# Patient Record
Sex: FEMALE | Race: WHITE | URBAN - METROPOLITAN AREA
[De-identification: names, ages, dates, MRNs, and addresses within clinical notes are randomized per-mention and may not be internally consistent; named-entity substitution may affect disease eponyms.]

---

## 2017-03-01 ENCOUNTER — IMPORTED ENCOUNTER (OUTPATIENT)
Dept: URBAN - METROPOLITAN AREA CLINIC 50 | Facility: CLINIC | Age: 72
End: 2017-03-01

## 2017-06-20 ENCOUNTER — IMPORTED ENCOUNTER (OUTPATIENT)
Dept: URBAN - METROPOLITAN AREA CLINIC 50 | Facility: CLINIC | Age: 72
End: 2017-06-20

## 2017-06-20 NOTE — PATIENT DISCUSSION
"""Follow drusen without treatment. Call if vision or distortion increases. Recommend regular amsler checks. """

## 2017-06-23 ENCOUNTER — IMPORTED ENCOUNTER (OUTPATIENT)
Dept: URBAN - METROPOLITAN AREA CLINIC 50 | Facility: CLINIC | Age: 72
End: 2017-06-23

## 2017-10-19 ENCOUNTER — IMPORTED ENCOUNTER (OUTPATIENT)
Dept: URBAN - METROPOLITAN AREA CLINIC 50 | Facility: CLINIC | Age: 72
End: 2017-10-19

## 2017-10-20 ENCOUNTER — IMPORTED ENCOUNTER (OUTPATIENT)
Dept: URBAN - METROPOLITAN AREA CLINIC 50 | Facility: CLINIC | Age: 72
End: 2017-10-20

## 2017-12-18 ENCOUNTER — IMPORTED ENCOUNTER (OUTPATIENT)
Dept: URBAN - METROPOLITAN AREA CLINIC 50 | Facility: CLINIC | Age: 72
End: 2017-12-18

## 2017-12-19 ENCOUNTER — IMPORTED ENCOUNTER (OUTPATIENT)
Dept: URBAN - METROPOLITAN AREA CLINIC 50 | Facility: CLINIC | Age: 72
End: 2017-12-19

## 2017-12-21 ENCOUNTER — IMPORTED ENCOUNTER (OUTPATIENT)
Dept: URBAN - METROPOLITAN AREA CLINIC 50 | Facility: CLINIC | Age: 72
End: 2017-12-21

## 2017-12-27 ENCOUNTER — IMPORTED ENCOUNTER (OUTPATIENT)
Dept: URBAN - METROPOLITAN AREA CLINIC 50 | Facility: CLINIC | Age: 72
End: 2017-12-27

## 2018-01-10 ENCOUNTER — IMPORTED ENCOUNTER (OUTPATIENT)
Dept: URBAN - METROPOLITAN AREA CLINIC 50 | Facility: CLINIC | Age: 73
End: 2018-01-10

## 2018-01-10 NOTE — PATIENT DISCUSSION
"""Suggesting we perform the SLT laser to get the IOP down.  OD then OS."" ""Continue Zioptan both eyes at bedtime. """

## 2018-01-10 NOTE — PATIENT DISCUSSION
"""Continue Restasis both eyes twice a day. "" ""Continue Refresh both eyes two - four times a day.  """

## 2018-01-23 ENCOUNTER — IMPORTED ENCOUNTER (OUTPATIENT)
Dept: URBAN - METROPOLITAN AREA CLINIC 50 | Facility: CLINIC | Age: 73
End: 2018-01-23

## 2018-02-13 ENCOUNTER — IMPORTED ENCOUNTER (OUTPATIENT)
Dept: URBAN - METROPOLITAN AREA CLINIC 50 | Facility: CLINIC | Age: 73
End: 2018-02-13

## 2018-02-13 NOTE — PATIENT DISCUSSION
"""SLT performed today with signed consent. IOP stable."" ""Continue Zioptan both eyes at bedtime. "" ""Start Prednisolone Acetate left eye twice a day.  for 1 week

## 2018-05-08 ENCOUNTER — IMPORTED ENCOUNTER (OUTPATIENT)
Dept: URBAN - METROPOLITAN AREA CLINIC 50 | Facility: CLINIC | Age: 73
End: 2018-05-08

## 2018-07-10 ENCOUNTER — IMPORTED ENCOUNTER (OUTPATIENT)
Dept: URBAN - METROPOLITAN AREA CLINIC 50 | Facility: CLINIC | Age: 73
End: 2018-07-10

## 2018-07-10 NOTE — PATIENT DISCUSSION
"""Continue Restasis both eyes twice a day. "" ""Continue Refresh Optive Jovan-3 both eyes two - four times a day.  or Refresh Gel"""

## 2018-12-17 ENCOUNTER — IMPORTED ENCOUNTER (OUTPATIENT)
Dept: URBAN - METROPOLITAN AREA CLINIC 50 | Facility: CLINIC | Age: 73
End: 2018-12-17

## 2019-01-10 ENCOUNTER — IMPORTED ENCOUNTER (OUTPATIENT)
Dept: URBAN - METROPOLITAN AREA CLINIC 50 | Facility: CLINIC | Age: 74
End: 2019-01-10

## 2019-01-18 ENCOUNTER — IMPORTED ENCOUNTER (OUTPATIENT)
Dept: URBAN - METROPOLITAN AREA CLINIC 50 | Facility: CLINIC | Age: 74
End: 2019-01-18

## 2019-02-23 ENCOUNTER — IMPORTED ENCOUNTER (OUTPATIENT)
Dept: URBAN - METROPOLITAN AREA CLINIC 50 | Facility: CLINIC | Age: 74
End: 2019-02-23

## 2019-04-16 ENCOUNTER — IMPORTED ENCOUNTER (OUTPATIENT)
Dept: URBAN - METROPOLITAN AREA CLINIC 50 | Facility: CLINIC | Age: 74
End: 2019-04-16

## 2019-06-19 ENCOUNTER — IMPORTED ENCOUNTER (OUTPATIENT)
Dept: URBAN - METROPOLITAN AREA CLINIC 50 | Facility: CLINIC | Age: 74
End: 2019-06-19

## 2019-06-19 NOTE — PATIENT DISCUSSION
"""Continue Restasis both eyes twice a day ."" ""Continue Refresh Optive Jovan-3 both eyes two - four times a day

## 2019-08-29 NOTE — PATIENT DISCUSSION
BOTOX for COSMETIC (30 units): The patient presented with multiple facial rhytids after informed consent the patient was treated with Botox at a dosage documented on [employer] in this cart. The patient tolerated the procedure well.

## 2019-09-20 NOTE — PATIENT DISCUSSION
BOTOX for COSMETIC (5 units): The patient presented with multiple facial rhytids after informed consent the patient was treated with Botox at a dosage documented on [employer] in this cart. The patient tolerated the procedure well.

## 2019-10-15 NOTE — PATIENT DISCUSSION
2 vials of Voluma were given to the cheeks after appropriate consent with a careful review of the risks and benefits. The area was prepped with an isopropyl alcohol pad. The patient was given ice packs to minimize discomfort before, during, and after the procedure. The patient tolerated the procedure well and was given post procedural care instructions.

## 2019-10-15 NOTE — PATIENT DISCUSSION
BOTOX for COSMETIC (10 units): The patient presented with multiple facial rhytids after informed consent the patient was treated with Botox at a dosage documented on [employer] in this cart. The patient tolerated the procedure well.

## 2019-11-21 ENCOUNTER — IMPORTED ENCOUNTER (OUTPATIENT)
Dept: URBAN - METROPOLITAN AREA CLINIC 50 | Facility: CLINIC | Age: 74
End: 2019-11-21

## 2019-12-13 NOTE — PATIENT DISCUSSION
One vial of Voluma were given to the cheeks after appropriate consent with a careful review of the risks and benefits. The area was prepped with an isopropyl alcohol pad. The patient was given ice packs to minimize discomfort before, during, and after the procedure. The patient tolerated the procedure well and was given post procedural care instructions.

## 2020-01-31 ENCOUNTER — IMPORTED ENCOUNTER (OUTPATIENT)
Dept: URBAN - METROPOLITAN AREA CLINIC 50 | Facility: CLINIC | Age: 75
End: 2020-01-31

## 2020-02-07 NOTE — PATIENT DISCUSSION
1 vial of Voluma was given to the cheeks after appropriate consent with a careful review of the risks and benefits. The area was prepped with an isopropyl alcohol pad and a chlorhexadine swab. The patient was given ice packs to minimize discomfort before, during, and after the procedure. The patient tolerated the procedure well and was given post procedural care instructions.

## 2020-03-17 NOTE — PATIENT DISCUSSION
One vial of Voluma was given to the cheeks after appropriate consent with a careful review of the risks and benefits. The area was prepped with chlorhexidine swabstick. The patient was given ice packs to minimize discomfort before, during, and after the procedure. The patient tolerated the procedure well and was given post procedural care instructions.

## 2020-07-23 ENCOUNTER — IMPORTED ENCOUNTER (OUTPATIENT)
Dept: URBAN - METROPOLITAN AREA CLINIC 50 | Facility: CLINIC | Age: 75
End: 2020-07-23

## 2020-07-30 ENCOUNTER — IMPORTED ENCOUNTER (OUTPATIENT)
Dept: URBAN - METROPOLITAN AREA CLINIC 50 | Facility: CLINIC | Age: 75
End: 2020-07-30

## 2020-07-31 ENCOUNTER — IMPORTED ENCOUNTER (OUTPATIENT)
Dept: URBAN - METROPOLITAN AREA CLINIC 50 | Facility: CLINIC | Age: 75
End: 2020-07-31

## 2020-08-07 ENCOUNTER — IMPORTED ENCOUNTER (OUTPATIENT)
Dept: URBAN - METROPOLITAN AREA CLINIC 50 | Facility: CLINIC | Age: 75
End: 2020-08-07

## 2020-08-07 NOTE — PATIENT DISCUSSION
"""s/p SLT OD (1/23/18) OS (2/13/18)- IOP stable at this time without drop therapy.  Will monitor."""

## 2020-08-07 NOTE — PATIENT DISCUSSION
"""Continue Restasis both eyes twice a day ."" ""Continue Artificial tears both eyes twice a day ."" ""Continue Warm compresses both eyes as needed

## 2021-02-10 ENCOUNTER — IMPORTED ENCOUNTER (OUTPATIENT)
Dept: URBAN - METROPOLITAN AREA CLINIC 50 | Facility: CLINIC | Age: 76
End: 2021-02-10

## 2021-02-10 NOTE — PATIENT DISCUSSION
"""Continue Restasis both eyes twice a day ."" ""Continue Artificial tears both eyes as needed . "" ""Continue Warm compresses both eyes as needed

## 2021-04-18 ASSESSMENT — VISUAL ACUITY
OS_CC: J1+'PUSH'
OS_CC: J1@ 16 IN
OS_CC: 20/25-1
OD_OTHER: 20/40. 20/50.
OS_CC: 20/20-2
OS_CC: 20/25-1
OD_CC: 20/25
OD_BAT: 20/40
OS_CC: 20/25
OD_OTHER: 20/50. 20/60.
OS_CC: 20/20
OS_CC: 20/20
OD_CC: 20/25
OD_CC: 20/25
OS_CC: 20/20
OS_CC: 20/30
OD_CC: 20/20
OS_CC: 20/25
OD_CC: 20/20
OD_BAT: 20/30
OS_OTHER: 20/40. 20/60.
OS_OTHER: 20/25. 20/30.
OS_CC: 20/20
OS_BAT: 20/40
OD_BAT: 20/50
OD_CC: 20/25
OS_CC: 20/30+1
OD_CC: J1+ SLOWLY
OD_CC: J1@ 16 IN
OS_CC: 20/20
OD_BAT: 20/25
OD_OTHER: 20/25. 20/30.
OD_CC: 20/25
OD_CC: 20/25
OS_CC: 20/25-1
OS_CC: 20/30-1
OS_CC: 20/30+
OD_CC: 20/25-1
OD_CC: 20/20
OD_CC: J1+@ 15 IN
OS_BAT: 20/25
OD_CC: 20/20
OD_CC: J1+'PUSH'
OS_OTHER: 20/40. 20/40-.
OS_OTHER: 20/30. 20/40.
OD_CC: 20/25
OD_PH: 20/25-1
OD_CC: 20/30-1
OS_CC: 20/25-1
OD_OTHER: 20/30. 20/40.
OD_CC: 20/25-1
OS_PH: 20/30
OS_CC: 20/20
OD_CC: 20/30-1
OS_BAT: 20/30
OD_CC: 20/25
OS_CC: J1+@ 15 IN
OS_CC: J1+ SLOWLY
OS_BAT: 20/40
OD_CC: 20/25-1

## 2021-04-18 ASSESSMENT — TONOMETRY
OD_IOP_MMHG: 14
OD_IOP_MMHG: 15
OS_IOP_MMHG: 15
OD_IOP_MMHG: 14
OS_IOP_MMHG: 19
OD_IOP_MMHG: 16
OD_IOP_MMHG: 14
OD_IOP_MMHG: 17
OD_IOP_MMHG: 14
OS_IOP_MMHG: 12
OD_IOP_MMHG: 15
OD_IOP_MMHG: 14
OS_IOP_MMHG: 14
OD_IOP_MMHG: 16
OS_IOP_MMHG: 11
OS_IOP_MMHG: 14
OD_IOP_MMHG: 20
OD_IOP_MMHG: 14
OS_IOP_MMHG: 15
OS_IOP_MMHG: 17
OD_IOP_MMHG: 15
OS_IOP_MMHG: 13
OS_IOP_MMHG: 10
OS_IOP_MMHG: 17
OS_IOP_MMHG: 18
OS_IOP_MMHG: 16
OD_IOP_MMHG: 13
OD_IOP_MMHG: 22
OS_IOP_MMHG: 15
OD_IOP_MMHG: 27
OS_IOP_MMHG: 13
OD_IOP_MMHG: 13
OS_IOP_MMHG: 15
OS_IOP_MMHG: 12
OS_IOP_MMHG: 11
OD_IOP_MMHG: 15
OS_IOP_MMHG: 15
OS_IOP_MMHG: 12
OD_IOP_MMHG: 17
OD_IOP_MMHG: 16
OS_IOP_MMHG: 12
OS_IOP_MMHG: 14
OS_IOP_MMHG: 13
OD_IOP_MMHG: 13
OS_IOP_MMHG: 13
OD_IOP_MMHG: 21
OD_IOP_MMHG: 13
OD_IOP_MMHG: 18
OS_IOP_MMHG: 17
OD_IOP_MMHG: 13
OS_IOP_MMHG: 12
OS_IOP_MMHG: 11
OD_IOP_MMHG: 15
OD_IOP_MMHG: 28
OS_IOP_MMHG: 17
OD_IOP_MMHG: 12

## 2021-04-18 ASSESSMENT — PACHYMETRY
OD_CT_UM: 570
OS_CT_UM: 579
OD_CT_UM: 570
OS_CT_UM: 579
OD_CT_UM: 570
OS_CT_UM: 579
OD_CT_UM: 570
OS_CT_UM: 579
OD_CT_UM: 570
OS_CT_UM: 579
OD_CT_UM: 570
OS_CT_UM: 579
OD_CT_UM: 570
OS_CT_UM: 579
OS_CT_UM: 579
OD_CT_UM: 570
OS_CT_UM: 579
OD_CT_UM: 570
OS_CT_UM: 579
OD_CT_UM: 570
OS_CT_UM: 579
OD_CT_UM: 570
OD_CT_UM: 570
OS_CT_UM: 579
OD_CT_UM: 570
OD_CT_UM: 570
OS_CT_UM: 579
OD_CT_UM: 570
OS_CT_UM: 579

## 2021-07-20 ENCOUNTER — PREPPED CHART (OUTPATIENT)
Dept: URBAN - METROPOLITAN AREA CLINIC 49 | Facility: CLINIC | Age: 76
End: 2021-07-20

## 2021-07-20 ENCOUNTER — DIAGNOSTIC TESTING ONLY (OUTPATIENT)
Dept: URBAN - METROPOLITAN AREA CLINIC 49 | Facility: CLINIC | Age: 76
End: 2021-07-20

## 2021-07-20 DIAGNOSIS — H40.1131: ICD-10-CM

## 2021-07-20 PROCEDURE — 92083 EXTENDED VISUAL FIELD XM: CPT

## 2021-07-20 PROCEDURE — 92133 CPTRZD OPH DX IMG PST SGM ON: CPT

## 2021-07-20 NOTE — PATIENT DISCUSSION
"""s/p SLT OD (1/23/18) OS (2/13/18)- IOP stable at this time without drop therapy. Will monitor. "". "

## 2021-07-29 ENCOUNTER — DIAGNOSTIC TESTING ONLY (OUTPATIENT)
Dept: URBAN - METROPOLITAN AREA CLINIC 48 | Facility: CLINIC | Age: 76
End: 2021-07-29

## 2021-07-29 DIAGNOSIS — H47.233: ICD-10-CM

## 2021-07-29 PROCEDURE — 92273 FULL FIELD ERG W/I&R: CPT

## 2021-08-18 ENCOUNTER — ROUTINE EXAM (OUTPATIENT)
Dept: URBAN - METROPOLITAN AREA CLINIC 53 | Facility: CLINIC | Age: 76
End: 2021-08-18

## 2021-08-18 DIAGNOSIS — H40.1131: ICD-10-CM

## 2021-08-18 DIAGNOSIS — H16.223: ICD-10-CM

## 2021-08-18 DIAGNOSIS — H35.363: ICD-10-CM

## 2021-08-18 DIAGNOSIS — H35.371: ICD-10-CM

## 2021-08-18 DIAGNOSIS — H43.811: ICD-10-CM

## 2021-08-18 DIAGNOSIS — H47.233: ICD-10-CM

## 2021-08-18 DIAGNOSIS — H25.813: ICD-10-CM

## 2021-08-18 DIAGNOSIS — Z01.01: ICD-10-CM

## 2021-08-18 PROCEDURE — 92015 DETERMINE REFRACTIVE STATE: CPT

## 2021-08-18 PROCEDURE — 92014 COMPRE OPH EXAM EST PT 1/>: CPT

## 2021-08-18 PROCEDURE — 92134 CPTRZ OPH DX IMG PST SGM RTA: CPT

## 2021-08-18 ASSESSMENT — VISUAL ACUITY
OD_CC: 20/30+2
OS_CC: 20/30
OU_CC: J1+
OS_GLARE: 20/40
OD_GLARE: 20/50
OS_GLARE: 20/30
OD_GLARE: 20/60

## 2021-08-18 ASSESSMENT — TONOMETRY
OD_IOP_MMHG: 13
OS_IOP_MMHG: 13
OS_IOP_MMHG: 11
OD_IOP_MMHG: 14

## 2021-08-18 NOTE — PATIENT DISCUSSION
Baseline FFERG reviewed at today's appointment. Will continue to monitor annually with [facility] [place].

## 2021-08-18 NOTE — PATIENT DISCUSSION
S/P SLT OD 1/23/2018; OS 2/13/2018. IOP stable without medicated drop therapy at this time. Will continue to monitor. Reviewed HVF/RNFL with patient at today's appointment.

## 2022-04-18 NOTE — PATIENT DISCUSSION
Recommend 30U of cosmetic botox to forehead and crow's feet. Recommend one syringe of Voluma to cheeks. Discussed the risks and benefits of the procedure, patient voiced understanding and wishes to proceed.

## 2022-04-18 NOTE — PROCEDURE NOTE: CLINICAL
PROCEDURE NOTE: Botox, Cosmetic BOTOX for COSMETIC (30 units): The patient presented with multiple facial rhytids after informed consent the patient was treated with Botox at a dosage documented in this cart. The patient tolerated the procedure well. Joyce Hotmarjan PROCEDURE NOTE: Juvederm Voluma #1 OU. Diagnosis: Rhytids. One syringe of Voluma was given to the cheeks after appropriate consent with a careful review of the risks and benefits. The area was prepped with a chlorhexidine swab. The patient tolerated the procedure well and was given post procedural care instructions. [facility]

## 2022-04-18 NOTE — PROCEDURE NOTE: CLINICAL
PROCEDURE NOTE: Botox, Cosmetic BOTOX for COSMETIC (30 units): The patient presented with multiple facial rhytids after informed consent the patient was treated with Botox at a dosage documented in this cart. The patient tolerated the procedure well. Joyce Hotmarjan PROCEDURE NOTE: Juvederm Voluma #1 OU. Diagnosis: Rhytids. One syringe of Voluma was given to the cheeks after appropriate consent with a careful review of the risks and benefits. The area was prepped with a chlorhexidine swab. The patient tolerated the procedure well and was given post procedural care instructions. [facility]

## 2022-08-18 ENCOUNTER — COMPREHENSIVE EXAM (OUTPATIENT)
Dept: URBAN - METROPOLITAN AREA CLINIC 49 | Facility: CLINIC | Age: 77
End: 2022-08-18

## 2022-08-18 DIAGNOSIS — H40.1131: ICD-10-CM

## 2022-08-18 DIAGNOSIS — H25.813: ICD-10-CM

## 2022-08-18 DIAGNOSIS — H35.371: ICD-10-CM

## 2022-08-18 DIAGNOSIS — H35.363: ICD-10-CM

## 2022-08-18 DIAGNOSIS — H43.811: ICD-10-CM

## 2022-08-18 DIAGNOSIS — H04.123: ICD-10-CM

## 2022-08-18 PROCEDURE — 92015 DETERMINE REFRACTIVE STATE: CPT

## 2022-08-18 PROCEDURE — 92083 EXTENDED VISUAL FIELD XM: CPT

## 2022-08-18 PROCEDURE — 92133 CPTRZD OPH DX IMG PST SGM ON: CPT

## 2022-08-18 PROCEDURE — 92014 COMPRE OPH EXAM EST PT 1/>: CPT

## 2022-08-18 ASSESSMENT — VISUAL ACUITY
OD_CC: 20/25+1
OU_CC: J1 @16IN
OS_CC: 20/20-2
OD_GLARE: 20/40
OS_GLARE: 20/25
OD_GLARE: 20/30-1
OS_GLARE: 20/40

## 2022-08-18 ASSESSMENT — TONOMETRY
OD_IOP_MMHG: 14
OS_IOP_MMHG: 15
OD_IOP_MMHG: 13
OS_IOP_MMHG: 13

## 2022-08-18 NOTE — PATIENT DISCUSSION
Baseline FFERG reviewed at today's appointment. Will continue to monitor annually with [facility] [place].

## 2022-08-18 NOTE — PATIENT DISCUSSION
Continue current Restasis drop therapy. Patient currently using AT's prn. Advised to use daily OU BID.

## 2022-08-18 NOTE — PATIENT DISCUSSION
HVF/RNFL OCT testing was reviewed with patient. The IOP is in the target range without the need for drop therapy. Follow up in 6 months for IOP check.

## 2023-05-11 ENCOUNTER — FOLLOW UP (OUTPATIENT)
Dept: URBAN - METROPOLITAN AREA CLINIC 49 | Facility: CLINIC | Age: 78
End: 2023-05-11

## 2023-05-11 DIAGNOSIS — H40.1131: ICD-10-CM

## 2023-05-11 PROCEDURE — 92012 INTRM OPH EXAM EST PATIENT: CPT

## 2023-05-11 ASSESSMENT — VISUAL ACUITY
OS_CC: 20/25-2
OD_CC: 20/25-2

## 2023-05-11 ASSESSMENT — TONOMETRY
OS_IOP_MMHG: 14
OS_IOP_MMHG: 16
OD_IOP_MMHG: 16
OD_IOP_MMHG: 15

## 2023-09-12 ENCOUNTER — COMPREHENSIVE EXAM (OUTPATIENT)
Dept: URBAN - METROPOLITAN AREA CLINIC 49 | Facility: LOCATION | Age: 78
End: 2023-09-12

## 2023-09-12 DIAGNOSIS — H02.831: ICD-10-CM

## 2023-09-12 DIAGNOSIS — H43.813: ICD-10-CM

## 2023-09-12 DIAGNOSIS — H40.1131: ICD-10-CM

## 2023-09-12 DIAGNOSIS — H35.363: ICD-10-CM

## 2023-09-12 DIAGNOSIS — H35.371: ICD-10-CM

## 2023-09-12 DIAGNOSIS — H25.813: ICD-10-CM

## 2023-09-12 DIAGNOSIS — H16.223: ICD-10-CM

## 2023-09-12 DIAGNOSIS — H02.834: ICD-10-CM

## 2023-09-12 PROCEDURE — 92133 CPTRZD OPH DX IMG PST SGM ON: CPT

## 2023-09-12 PROCEDURE — 92014 COMPRE OPH EXAM EST PT 1/>: CPT

## 2023-09-12 PROCEDURE — 92083 EXTENDED VISUAL FIELD XM: CPT

## 2023-09-12 ASSESSMENT — KERATOMETRY
OD_AXISANGLE_DEGREES: 110
OD_K2POWER_DIOPTERS: 45.00
OD_AXISANGLE2_DEGREES: 20
OD_K1POWER_DIOPTERS: 43.75
OS_AXISANGLE2_DEGREES: 156
OS_AXISANGLE_DEGREES: 66
OS_K1POWER_DIOPTERS: 44.00
OS_K2POWER_DIOPTERS: 45.00

## 2023-09-12 ASSESSMENT — VISUAL ACUITY
OD_GLARE: 20/30
OD_PH: 20/25-2
OD_CC: 20/30
OS_GLARE: 20/20
OD_GLARE: 20/25
OS_CC: 20/20
OU_CC: J1+@16
OS_GLARE: 20/30

## 2023-09-12 ASSESSMENT — TONOMETRY
OD_IOP_MMHG: 15
OD_IOP_MMHG: 16
OS_IOP_MMHG: 14
OS_IOP_MMHG: 16

## 2024-03-12 ENCOUNTER — FOLLOW UP (OUTPATIENT)
Dept: URBAN - METROPOLITAN AREA CLINIC 49 | Facility: LOCATION | Age: 79
End: 2024-03-12

## 2024-03-12 DIAGNOSIS — H40.1131: ICD-10-CM

## 2024-03-12 PROCEDURE — 99213 OFFICE O/P EST LOW 20 MIN: CPT

## 2024-03-12 RX ORDER — OLOPATADINE HYDROCHLORIDE 2 MG/ML
1 SOLUTION OPHTHALMIC TWICE A DAY
Start: 2024-03-12

## 2024-03-12 ASSESSMENT — VISUAL ACUITY
OS_CC: 20/25-2
OD_CC: 20/30-2
OD_PH: 20/30+1

## 2024-03-12 ASSESSMENT — KERATOMETRY
OS_AXISANGLE_DEGREES: 66
OS_K1POWER_DIOPTERS: 44.00
OD_K1POWER_DIOPTERS: 43.75
OS_K2POWER_DIOPTERS: 45.00
OD_K2POWER_DIOPTERS: 45.00
OS_AXISANGLE2_DEGREES: 156
OD_AXISANGLE2_DEGREES: 20
OD_AXISANGLE_DEGREES: 110

## 2024-03-12 ASSESSMENT — TONOMETRY
OD_IOP_MMHG: 16
OS_IOP_MMHG: 14
OD_IOP_MMHG: 15
OS_IOP_MMHG: 16

## 2024-06-25 ENCOUNTER — FOLLOW UP (OUTPATIENT)
Dept: URBAN - METROPOLITAN AREA CLINIC 53 | Facility: CLINIC | Age: 79
End: 2024-06-25

## 2024-06-25 DIAGNOSIS — H40.1131: ICD-10-CM

## 2024-06-25 PROCEDURE — 99212 OFFICE O/P EST SF 10 MIN: CPT

## 2024-06-25 ASSESSMENT — VISUAL ACUITY
OU_CC: 20/25
OD_PH: 20/30+1
OS_CC: 20/25-1
OD_CC: 20/30-2

## 2024-06-25 ASSESSMENT — KERATOMETRY
OD_AXISANGLE2_DEGREES: 20
OS_K1POWER_DIOPTERS: 44.00
OD_AXISANGLE_DEGREES: 110
OD_K1POWER_DIOPTERS: 43.75
OD_K2POWER_DIOPTERS: 45.00
OS_AXISANGLE2_DEGREES: 156
OS_AXISANGLE_DEGREES: 66
OS_K2POWER_DIOPTERS: 45.00

## 2024-06-25 ASSESSMENT — TONOMETRY
OD_IOP_MMHG: 13
OD_IOP_MMHG: 12
OS_IOP_MMHG: 11
OS_IOP_MMHG: 13

## 2024-09-24 ENCOUNTER — COMPREHENSIVE EXAM (OUTPATIENT)
Dept: URBAN - METROPOLITAN AREA CLINIC 49 | Facility: LOCATION | Age: 79
End: 2024-09-24

## 2024-09-24 DIAGNOSIS — H04.123: ICD-10-CM

## 2024-09-24 DIAGNOSIS — H40.1131: ICD-10-CM

## 2024-09-24 DIAGNOSIS — H25.13: ICD-10-CM

## 2024-09-24 PROCEDURE — 92133 CPTRZD OPH DX IMG PST SGM ON: CPT

## 2024-09-24 PROCEDURE — 92014 COMPRE OPH EXAM EST PT 1/>: CPT

## 2024-09-24 PROCEDURE — 92015 DETERMINE REFRACTIVE STATE: CPT

## 2024-10-25 ENCOUNTER — DIAGNOSTICS ONLY (OUTPATIENT)
Dept: URBAN - METROPOLITAN AREA CLINIC 49 | Facility: LOCATION | Age: 79
End: 2024-10-25

## 2024-10-25 DIAGNOSIS — H40.1131: ICD-10-CM

## 2024-10-25 PROCEDURE — 92083 EXTENDED VISUAL FIELD XM: CPT

## 2024-10-25 PROCEDURE — 92133 CPTRZD OPH DX IMG PST SGM ON: CPT

## 2024-11-21 ENCOUNTER — DIAGNOSTICS ONLY (OUTPATIENT)
Dept: URBAN - METROPOLITAN AREA CLINIC 49 | Facility: LOCATION | Age: 79
End: 2024-11-21

## 2024-11-21 DIAGNOSIS — H40.1131: ICD-10-CM

## 2024-11-21 PROCEDURE — 92083 EXTENDED VISUAL FIELD XM: CPT

## 2025-05-20 ENCOUNTER — COMPREHENSIVE EXAM (OUTPATIENT)
Age: 80
End: 2025-05-20

## 2025-05-20 DIAGNOSIS — H35.371: ICD-10-CM

## 2025-05-20 DIAGNOSIS — H35.363: ICD-10-CM

## 2025-05-20 DIAGNOSIS — H40.1131: ICD-10-CM

## 2025-05-20 PROCEDURE — 92015 DETERMINE REFRACTIVE STATE: CPT

## 2025-05-20 PROCEDURE — 92134 CPTRZ OPH DX IMG PST SGM RTA: CPT

## 2025-05-20 PROCEDURE — 99214 OFFICE O/P EST MOD 30 MIN: CPT | Mod: 57

## 2025-07-01 ENCOUNTER — PRE-OP/H&P (OUTPATIENT)
Age: 80
End: 2025-07-01

## 2025-07-01 DIAGNOSIS — H40.1131: ICD-10-CM

## 2025-07-01 DIAGNOSIS — H25.813: ICD-10-CM

## 2025-07-01 DIAGNOSIS — H35.371: ICD-10-CM

## 2025-07-01 PROCEDURE — 92136 OPHTHALMIC BIOMETRY: CPT

## 2025-07-01 PROCEDURE — 92025IOL CORNEAL TOPOGRAPHY PREMIUM IOL

## 2025-07-01 PROCEDURE — 92134 CPTRZ OPH DX IMG PST SGM RTA: CPT

## 2025-07-01 PROCEDURE — PREOP PRE OP VISIT

## 2025-07-09 ENCOUNTER — POST-OP (OUTPATIENT)
Age: 80
End: 2025-07-09

## 2025-07-09 ENCOUNTER — SURGERY/PROCEDURE (OUTPATIENT)
Age: 80
End: 2025-07-09

## 2025-07-09 DIAGNOSIS — H25.811: ICD-10-CM

## 2025-07-09 DIAGNOSIS — H40.1111: ICD-10-CM

## 2025-07-09 DIAGNOSIS — Z98.890: ICD-10-CM

## 2025-07-09 DIAGNOSIS — Z96.1: ICD-10-CM

## 2025-07-09 DIAGNOSIS — Z98.41: ICD-10-CM

## 2025-07-09 PROCEDURE — 66991CV REMOVE CATARACT INSERTION ANTERIOR SEG DRAIN DEVICE, CUSTOM

## 2025-07-09 PROCEDURE — 66174 TRLUML DIL AQ O/F CAN W/O ST: CPT

## 2025-07-09 PROCEDURE — 99199PCV CUSTOM VISION

## 2025-07-15 ENCOUNTER — POST-OP (OUTPATIENT)
Age: 80
End: 2025-07-15

## 2025-07-15 DIAGNOSIS — Z98.41: ICD-10-CM

## 2025-07-15 DIAGNOSIS — H40.1111: ICD-10-CM

## 2025-07-15 DIAGNOSIS — Z96.1: ICD-10-CM

## 2025-07-15 DIAGNOSIS — H02.051: ICD-10-CM

## 2025-07-15 DIAGNOSIS — Z98.890: ICD-10-CM

## 2025-07-15 PROCEDURE — 67820 REVISE EYELASHES: CPT

## 2025-07-17 ENCOUNTER — EMERGENCY VISIT (OUTPATIENT)
Age: 80
End: 2025-07-17

## 2025-07-17 DIAGNOSIS — H04.123: ICD-10-CM

## 2025-07-17 DIAGNOSIS — H10.13: ICD-10-CM

## 2025-07-17 PROCEDURE — 99213 OFFICE O/P EST LOW 20 MIN: CPT | Mod: 24

## 2025-07-17 RX ORDER — OLOPATADINE HYDROCHLORIDE 2 MG/ML
1 SOLUTION OPHTHALMIC ONCE A DAY
Start: 2025-07-17

## 2025-07-24 ENCOUNTER — PROBLEM FOCUSED VISIT (OUTPATIENT)
Age: 80
End: 2025-07-24

## 2025-07-24 DIAGNOSIS — H01.00A: ICD-10-CM

## 2025-07-24 DIAGNOSIS — H01.00B: ICD-10-CM

## 2025-07-24 PROCEDURE — 99212 OFFICE O/P EST SF 10 MIN: CPT | Mod: 24

## 2025-08-05 ENCOUNTER — POST-OP (OUTPATIENT)
Age: 80
End: 2025-08-05

## 2025-08-05 DIAGNOSIS — Z98.41: ICD-10-CM

## 2025-08-05 DIAGNOSIS — Z96.1: ICD-10-CM

## 2025-08-05 PROCEDURE — 99024 POSTOP FOLLOW-UP VISIT: CPT
